# Patient Record
(demographics unavailable — no encounter records)

---

## 2024-10-07 NOTE — REASON FOR VISIT
[Cardiac Failure] : cardiac failure [Structural Heart and Valve Disease] : structural heart and valve disease [Hypertension] : hypertension [Follow-Up - Clinic] : a clinic follow-up of [FreeTextEntry2] : CAD, ICM, HTN HLD

## 2024-10-07 NOTE — DISCUSSION/SUMMARY
[FreeTextEntry1] : 66 year old woman with NICM, HTN HLD here for followup. No complaints Discussed ICD with her and daughter, patient does not want  Here for followup accompanied by her grandson She wants to schedule second cataract surgery  # CAD- non obstructive CAD on [facility] in 2008. Pharm stress in June 2023 showed NICM with EF 23% # Chronic systolic Heart Failure- Last TTE July 2017 showed severe global LV dysfunction with minimal MR, Remains on Lasix, Coreg and lisinopril. continue present meds TTE with severe global LV dysfunction # HTN- limiting salt, condition is stable, continue present meds. Advised to take Coreg bid, lasix 20 mg QD ( AM )  Lisinopril 40 mg QHS .  # HLD- continue statin therapy #Preop for cataract surgery- patient is medically optimized from cardiac perspective for cataract surgery; she may hold her Aspirin for 5 days prior to the surgery.  [EKG obtained to assist in diagnosis and management of assessed problem(s)] : EKG obtained to assist in diagnosis and management of assessed problem(s)

## 2025-04-07 NOTE — HISTORY OF PRESENT ILLNESS
[FreeTextEntry1] : Here for followup accompanied by her grandson She wants to schedule second cataract surgery   # CAD- non obstructive CAD on [facility] in 2008. Pharm stress in June 2023 showed NICM with EF 23% # Chronic systolic Heart Failure- Last TTE July 2017 showed severe global LV dysfunction with minimal MR, Remains on Lasix, Coreg and lisinopril. continue present meds TTE with severe global LV dysfunction # HTN- limiting salt, condition is stable, continue present meds. Advised to take Coreg bid, lasix 20 mg QD ( AM )  Lisinopril 40 mg QHS .  # HLD- continue statin therapy #Preop for cataract surgery- patient is medically optimized from cardiac perspective for cataract surgery; she may hold her Aspirin for 5 days prior to the surgery.

## 2025-04-07 NOTE — DISCUSSION/SUMMARY
[FreeTextEntry1] : 67 year old woman with NICM, HTN HLD here for followup. No complaints Discussed ICD with her and daughter, patient does not want  Here for followup accompanied by her grandson She wants to schedule second cataract surgery  # CAD- non obstructive CAD on [facility] in 2008. Pharm stress in June 2023 showed NICM with EF 23% # Chronic systolic Heart Failure- Last TTE July 2017 showed severe global LV dysfunction with minimal MR, Remains on Lasix, Coreg and lisinopril. continue present meds TTE with severe global LV dysfunction # HTN- limiting salt, condition is stable, continue present meds. Advised to take Coreg bid, lasix 20 mg QD ( AM )  Lisinopril 40 mg QHS .  # HLD- continue statin therapy #Preop for cataract surgery- patient is medically optimized from cardiac perspective for cataract surgery; she may hold her Aspirin for 5 days prior to the surgery.  [EKG obtained to assist in diagnosis and management of assessed problem(s)] : EKG obtained to assist in diagnosis and management of assessed problem(s)

## 2025-06-05 NOTE — ASSESSMENT
[Patient Optimized for Surgery] : Patient optimized for surgery [No Further Testing Recommended] : no further testing recommended [Modify anti-platelet treatment prior to procedure] : Modify anti-platelet treatment prior to procedure [Modify medications prior to procedure] : Modify medications prior to procedure [As per surgery] : as per surgery [FreeTextEntry4] : Patient optimized for procedure

## 2025-06-05 NOTE — RESULTS/DATA
[] : results reviewed [de-identified] : reviewed ECG with cardiology, LBBB and left axis @ 87 bpm [de-identified] : POCT a1c 6.8

## 2025-06-05 NOTE — HISTORY OF PRESENT ILLNESS
[(Patient denies any chest pain, claudication, dyspnea on exertion, orthopnea, palpitations or syncope)] : Patient denies any chest pain, claudication, dyspnea on exertion, orthopnea, palpitations or syncope [Coronary Artery Disease] : coronary artery disease [No Pertinent Pulmonary History] : no history of asthma, COPD, sleep apnea, or smoking [No Adverse Anesthesia Reaction] : no adverse anesthesia reaction in self or family member [Diabetes] : diabetes [____ METs%] : [unfilled] METs% [Good (7-10 METs)] : Good (7-10 METs) [Anti-Platelet Agents: _____] : Anti-Platelet Agents: [unfilled] [Aortic Stenosis] : no aortic stenosis [Atrial Fibrillation] : no atrial fibrillation [Recent Myocardial Infarction] : no recent myocardial infarction [Implantable Device/Pacemaker] : no implantable device/pacemaker [Chronic Anticoagulation] : no chronic anticoagulation [Chronic Kidney Disease] : no chronic kidney disease [FreeTextEntry1] : cataract surgery [FreeTextEntry2] : 06/09/25 [FreeTextEntry3] : MD Estrella Christian [FreeTextEntry4] : 68 yo female with PMHx CAD, CHF, HTN HLD DM2 presents to the office for pre operative clearance for cataract surgery  [FreeTextEntry7] : reviewed note from established cardiologist Preop for cataract surgery- patient is medically optimized from cardiac perspective for cataract surgery; she may hold her Aspirin for 5 days prior to the surgery.

## 2025-06-05 NOTE — RESULTS/DATA
[] : results reviewed [de-identified] : reviewed ECG with cardiology, LBBB and left axis @ 87 bpm [de-identified] : POCT a1c 6.8

## 2025-06-05 NOTE — HISTORY OF PRESENT ILLNESS
[(Patient denies any chest pain, claudication, dyspnea on exertion, orthopnea, palpitations or syncope)] : Patient denies any chest pain, claudication, dyspnea on exertion, orthopnea, palpitations or syncope [Coronary Artery Disease] : coronary artery disease [No Pertinent Pulmonary History] : no history of asthma, COPD, sleep apnea, or smoking [No Adverse Anesthesia Reaction] : no adverse anesthesia reaction in self or family member [Diabetes] : diabetes [____ METs%] : [unfilled] METs% [Good (7-10 METs)] : Good (7-10 METs) [Anti-Platelet Agents: _____] : Anti-Platelet Agents: [unfilled] [Aortic Stenosis] : no aortic stenosis [Atrial Fibrillation] : no atrial fibrillation [Recent Myocardial Infarction] : no recent myocardial infarction [Implantable Device/Pacemaker] : no implantable device/pacemaker [Chronic Anticoagulation] : no chronic anticoagulation [Chronic Kidney Disease] : no chronic kidney disease [FreeTextEntry1] : cataract surgery [FreeTextEntry2] : 06/09/25 [FreeTextEntry3] : MD Estrella Christian [FreeTextEntry4] : 66 yo female with PMHx CAD, CHF, HTN HLD DM2 presents to the office for pre operative clearance for cataract surgery  [FreeTextEntry7] : reviewed note from established cardiologist Preop for cataract surgery- patient is medically optimized from cardiac perspective for cataract surgery; she may hold her Aspirin for 5 days prior to the surgery.

## 2025-06-20 NOTE — ASSESSMENT
[FreeTextEntry1] : This is a 67F with DM c/b diabetic neuropathy and retinopathy, systolic heart failure (TTE in 2021 with global LV dysfunction and EF 14%), HLD, HTN, and hypothyroidism who was discharged from [facility] earlier this week for acute vision loss concerning for amaurosis fugax.   Discussed with the patient that between her cardiovascular risk factors and heart failure it's important that she discuss with her cardiologist. Additionally, will have her follow up with stroke neurology as she may need to be placed on anticoagulation if her episode is thought to have been cardioembolic.  With regards to her headaches, they are mild and overall unbothersome. Instructed her to continue with conservative treatments at home and can return if they become more severe or refractory.

## 2025-06-20 NOTE — HISTORY OF PRESENT ILLNESS
[FreeTextEntry1] :  #: [id number]  This is a 67F with DM c/b diabetic neuropathy and retinopathy, systolic heart failure (TTE in 2017 with global LV dysfunction and EF 23%), HLD, HTN, and hypothyroidism who comes in post hospitalization for vision loss.  The patient reports that over the last couple of months she has been getting mild right facial/eye pain which can radiate up the head. Symptoms would not interfere with her ability to function and she would treat it with vapor rub or Tylenol with good response. This past Monday, on 6/16/25, she developed sudden onset right eye pain radiating around the head and sudden right eye vision loss. Vision loss lasted around 20 minutes before self resolving. 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 She was discharged on Tuesday with instructions to follow up with a Neurologist.   On Wednesday she continued having some eye pain and dizziness, though much improved from Monday and without vision issues. She took a Tylenol and her symptoms resolved. Today, she is asymptomatic.

## 2025-06-20 NOTE — PHYSICAL EXAM
[Person] : oriented to person [Place] : oriented to place [Time] : oriented to time [Fluency] : fluency intact [Cranial Nerves Optic (II)] : visual acuity intact bilaterally,  visual fields full to confrontation, pupils equal round and reactive to light [Cranial Nerves Oculomotor (III)] : extraocular motion intact [Cranial Nerves Trigeminal (V)] : facial sensation intact symmetrically [Cranial Nerves Vestibulocochlear (VIII)] : hearing was intact bilaterally [Cranial Nerves Glossopharyngeal (IX)] : tongue and palate midline [Cranial Nerves Accessory (XI - Cranial And Spinal)] : head turning and shoulder shrug symmetric [Cranial Nerves Hypoglossal (XII)] : there was no tongue deviation with protrusion [Motor Tone] : muscle tone was normal in all four extremities [Motor Strength] : muscle strength was normal in all four extremities [No Muscle Atrophy] : normal bulk in all four extremities [Sensation Tactile Decrease] : light touch was intact [Sensation Pain / Temperature Decrease] : pain and temperature was intact [Abnormal Walk] : normal gait [2+] : Patella left 2+ [0] : Ankle jerk left 0

## 2025-06-23 NOTE — HISTORY OF PRESENT ILLNESS
[Post-hospitalization from ___ Hospital] : Post-hospitalization from [unfilled] Hospital [Discharge Summary] : discharge summary [Pertinent Labs] : pertinent labs [Radiology Findings] : radiology findings [Discharge Med List] : discharge medication list [Med Reconciliation] : medication reconciliation has been completed [FreeTextEntry2] : 66 yo female

## 2025-07-02 NOTE — DISCUSSION/SUMMARY
[FreeTextEntry1] : This is a 67F with DM c/b diabetic neuropathy and retinopathy, systolic heart failure (TTE in 2021 with global LV dysfunction and EF 14%), HLD, HTN, and hypothyroidism. She reports that on 6/16/25 she developed RIGHT frontal/temporal pain that radiated across her head. She then had acute onset of RIGHT blurry vision lasting 15-20 minutes. She denies complete vision loss, any associated neurological symptoms or jaw claudication. She notes once vision returned back to baseline, she was left with a right frontal headache all day. 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 It is possible she had some degree of hemianopia due to LEFT P2 stenosis; however imaging needs to be obtained for review. Differentials include GCA vs. migraine aura.   **Patient filled out release form to obtain CDs from [facility]   Overall patient is neurologically stable. Continue ASA 81 mg once daily for secondary stroke reduction. I have ordered MRI head, MRA head and neck to better evaluate cerebral vasculature and area of stenosis. CRP and ESR ordered to screen for possible GCA. Continue to follow up with PCP/cardiology for BP and statin management (goal LDL <70) as well as all routine primary care needs. Follow up with cardiology for evaluation and management of Holter vs. ILR to screen for source of embolism especially in setting of low EF. Screened patient for sleep apnea with no identifiable risk factors at this time. Will reevaluate next visit. We discussed aggressive vascular strict risk factor control.  I will call her after imaging is completed and I review case with stroke team.   [Antithrombotic therapy with ___] : antithrombotic therapy with  [unfilled] [Intensive Blood Pressure Control] : intensive blood pressure control [Lipid Lowering Therapy] : lipid lowering therapy [Patient encouraged to discuss with Primary MD] : I encouraged the patient to discuss these important issues with ~his/her~ primary care doctor [Goals and Counseling] : I have reviewed the goals of stroke risk factor modification. I counseled the patient on measures to reduce stroke risk, including the importance of medication compliance, risk factor control, exercise, healthy diet and avoidance of smoking. I reviewed stroke warning signs and symptoms and appropriate actions to take if such occur.

## 2025-07-02 NOTE — HISTORY OF PRESENT ILLNESS
[FreeTextEntry1] :  #: [id number]  This is a 67F with DM c/b diabetic neuropathy and retinopathy, systolic heart failure (TTE in 2017 with global LV dysfunction and EF 23%), HLD, HTN, and hypothyroidism who comes in post hospitalization for blurry vision  The patient reports that over the last couple of months she has been getting mild right facial/eye pain which can radiate up the head. Symptoms would not interfere with her ability to function, and she would treat it with vapor rub or Tylenol with good response. On Monday 6/16/25, she developed sudden onset of right frontal pain radiating around the head and sudden right eye blurry vision. The distorted vision lasted around 20 minutes before self-resolving. 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 She was discharged on Tuesday with instructions to follow up with a Neurologist.  7/2/2025 She reports that on 6/16 she developed RIGHT frontal/temporal pain that radiated across her head. She then had acute onset of RIGHT blurry vision lasting 15-20 minutes. She denies vision loss or associated symptoms. She notes once vision returned back to baseline, she was left with a right frontal headache all day. She takes ASA daily and notes her risk factors are moderately controlled. She followed up with Dr. Cowan who referred her to us for further evaluation. Denies any jaw claudication or temporal pain at this time. She followed up with ophthalmology with no reported causes.